# Patient Record
Sex: FEMALE | Race: WHITE
[De-identification: names, ages, dates, MRNs, and addresses within clinical notes are randomized per-mention and may not be internally consistent; named-entity substitution may affect disease eponyms.]

---

## 2017-05-24 ENCOUNTER — HOSPITAL ENCOUNTER (OUTPATIENT)
Dept: HOSPITAL 80 - FSGY | Age: 62
Discharge: HOME | End: 2017-05-24
Attending: INTERNAL MEDICINE
Payer: COMMERCIAL

## 2017-05-24 DIAGNOSIS — Z83.71: ICD-10-CM

## 2017-05-24 DIAGNOSIS — K63.5: Primary | ICD-10-CM

## 2017-05-24 DIAGNOSIS — Z86.010: ICD-10-CM

## 2017-05-24 PROCEDURE — 0D5H8ZZ DESTRUCTION OF CECUM, VIA NATURAL OR ARTIFICIAL OPENING ENDOSCOPIC: ICD-10-PCS | Performed by: INTERNAL MEDICINE

## 2017-05-24 PROCEDURE — 0DJD8ZZ INSPECTION OF LOWER INTESTINAL TRACT, VIA NATURAL OR ARTIFICIAL OPENING ENDOSCOPIC: ICD-10-PCS | Performed by: INTERNAL MEDICINE

## 2017-05-24 PROCEDURE — 0DBH8ZX EXCISION OF CECUM, VIA NATURAL OR ARTIFICIAL OPENING ENDOSCOPIC, DIAGNOSTIC: ICD-10-PCS | Performed by: INTERNAL MEDICINE

## 2017-05-24 NOTE — GPN
[f 
rep st]



                                                                 PROCEDURE NOTE





DATE OF PROCEDURE:  05/24/2017



PROCEDURES:  Colonoscopy and biopsy, and argon plasma coagulation and 

destruction of tissue.



INDICATIONS:  History of 19 mm polyp removed from the ascending colon in 
piecemeal fashion in October of 2015, followup colonoscopy in March of 2016 at 
the [facility] revealed a small amount of residual adenomatous tissue at that 
site.  This is to follow up that previous polypectomy site, and treat the area 
with the argon plasma coagulation.



INFORMED CONSENT:  I had a detailed discussed with the patient regarding the 
procedure, alternatives, benefits, and risks including bleeding, perforation, 
infection, risk of medication.  Informed consent was signed and witnessed.



COMPLICATIONS:  None immediate.



MEDICATIONS:  Versed 8 mg IV, fentanyl 100 mcg IV.



DESCRIPTION OF PROCEDURE:  After adequate sedation, the patient remained in 
left lateral decubitus position, and I performed a visual and digital anorectal 
examination.  The video colonoscope was inserted via the rectum and advanced 
under visualization into the cecum, identified by the ileocecal valve, 
appendiceal orifice, confluence of tinea.  Retroflexed examination was 
performed in the cecum.  On retroflex of the endoscope, there was a small 2 mm 
polyp located in the cecum that was removed in total by cold biopsy.  The 
previous polypectomy site at the ascending colon was noted with Ava ink on 
either area.  There was mildly abnormal--appearing tissue at that site.  
Multiple biopsies were obtained.  The area was then treated with argon plasma 
coagulation for tissue destruction.  Good coagulation and tissue destruction 
were noted.  The colonoscope was then slowly withdrawn, with careful attention 
paid to mucosal detail.  There were no other polyps or masses noted.  The 
retroflexed examination was performed.  The endoscope was un-retroflexed and 
removed, confirming above findings.  The patient tolerated the procedure well 
and was sent to recovery room in satisfactory condition.



IMPRESSION:  

1.  2 mm cecal polyp removed with cold biopsy.  

2.  Abnormal tissue at previous polypectomy site status post biopsy and argon 
plasma coagulation and destruction of any remaining polyp tissue.



RECOMMENDATIONS:  

1.  Follow up pathology.  

2.  Avoid aspirin and nonsteroidal anti-inflammatory drugs for 10 days, except 
as using for cardiac or stroke prevention.  Tylenol is fine to use.

3.  If there is no residual adenomatous tissue, I would recommend colonoscopy 
in 3 years.  If there is residual adenomatous tissue at the previous 
polypectomy site, I recommend colonoscopy in 1 year.

4.  High-fiber diet.

5.  Followup with primary care physician as scheduled. 





Thank you for allowing me to participate in the patient's healthcare.  Do not 
hesitate to call me with any questions.





Job #:  [id number]

MTDD

## 2017-06-20 ENCOUNTER — HOSPITAL ENCOUNTER (OUTPATIENT)
Dept: HOSPITAL 80 - FIMAGING | Age: 62
End: 2017-06-20
Attending: INTERNAL MEDICINE
Payer: COMMERCIAL

## 2017-06-20 DIAGNOSIS — Z12.31: Primary | ICD-10-CM

## 2017-06-20 PROCEDURE — G0202 SCR MAMMO BI INCL CAD: HCPCS

## 2018-05-30 ENCOUNTER — HOSPITAL ENCOUNTER (OUTPATIENT)
Dept: HOSPITAL 80 - FSGY | Age: 63
Discharge: HOME | End: 2018-05-30
Attending: INTERNAL MEDICINE
Payer: COMMERCIAL

## 2018-05-30 VITALS — DIASTOLIC BLOOD PRESSURE: 78 MMHG | SYSTOLIC BLOOD PRESSURE: 121 MMHG

## 2018-05-30 DIAGNOSIS — Z78.0: ICD-10-CM

## 2018-05-30 DIAGNOSIS — Z12.11: Primary | ICD-10-CM

## 2018-05-30 DIAGNOSIS — Z86.010: ICD-10-CM

## 2018-05-30 PROCEDURE — 0DBK8ZZ EXCISION OF ASCENDING COLON, VIA NATURAL OR ARTIFICIAL OPENING ENDOSCOPIC: ICD-10-PCS | Performed by: INTERNAL MEDICINE

## 2018-05-30 NOTE — GIREPORT
[facility]

Surgical Services - Endoscopy Department

_____________________________________________________________________________________________________
_______

Patient Name: Queenie Murray                        Procedure Date: 5/30/2018 12:05 PM

MRN: [id number]                                       Account Number: [id number]

Patient Type: Outpatient                             Attending  MD/ ER Physician: ROSS Hermosillo

_____________________________________________________________________________________________________
_______

 

Procedure:                    Colonoscopy

Indications:                  Surveillance: Piecemeal removal of large sessile adenoma last colonosco
py (< 

                              3 yrs)

Providers:                    Omar Lopez MD

Referring MD:                 Flower Dumont

Medicines:                    Fentanyl 100 micrograms IV, Midazolam 6 mg IV

Complications:                No immediate complications. Estimated blood loss: Minimal.

Description of Procedure:     After obtaining informed consent, the scope was passed under direct vis
ion. 

                              Throughout the procedure, the patient's blood pressure, pulse, and oxyg
en 

                              saturations were monitored continuously. The Colonoscope with irrigatio
n 

                              channel was introduced through the anus and advanced to the terminal il
eum, 

                              with identification of the appendiceal orifice and IC valve. The colono
scopy 

                              was performed without difficulty. The patient tolerated the procedure w
ell. 

                              The quality of the bowel preparation was good.

Findings:                     The digital rectal exam was normal.

                              The terminal ileum appeared normal.

                              A post polypectomy scar was found in the ascending colon. The scar tiss
ue 

                              was healthy in appearance. There was no evidence of the previous polyp.
 

                              Biopsies were taken with a cold forceps for histology. Estimated blood 
loss 

                              was minimal.

                              The exam was otherwise without abnormality.

Estimated Blood Loss:         Estimated blood loss was minimal.

Post Op Diagnosis:            - The examined portion of the ileum was normal.

                              - Post-polypectomy scar in the ascending colon. Biopsied.

                              - The examination was otherwise normal.

Recommendation:               - Await pathology results.

                              - My office will call with the pathology result with 5-7 days. If you h
ave 

                              not heard from my office by 12-14, do not assume the pathology is alli
l, 

                              please call [phone] to get the pathology results.

                              - If the pathology report reveals no adenomatous tissue, then repeat th
e 

                              colonoscopy for surveillance in 3 years.

                              - If the pathology report reveals adenomatous tissue, then repeat the 

                              colonoscopy for surveillance in 1 year.

                              - Resume previous diet.

                              - Patient has a contact number available for emergencies. The signs and
 

                              symptoms of potential delayed complications were discussed with the pat
ient. 

                              Return to normal activities tomorrow. Written discharge instructions we
re 

                              provided to the patient.

                              - Patient medication history reviewed. Patient is appropriately not alaina
ing 

                              any medications.

                              - Discharge patient to home (ambulatory).

                              - Return to referring physician as previously scheduled.

                              - Thank you for allowing me to help in your patient's care. Do not hesi
still 

                              to call with any questions.

Attending Participation:

     I personally performed the entire procedure.

 

Jessica Nelson M.D

___________________

Omar Lopez MD

5/30/2018 12:55:04 PM

This report has been signed electronicallyMathew MD Jessica

Number of Addenda: 0

 

Note Initiated On: 5/30/2018 12:05 PM

Total Procedure Duration Time 0 hours 14 minutes 26 seconds 

[email or web address]

## 2018-05-30 NOTE — PDGENHP
History & Physical


Chief Complaint: phx polyps


History of Present Illness: phx polyps


Pertinent Past, Social, Family History: FHx - dad with type 2hodgkins nocc but 

fhx polyps.  no tobacco.  alcohol 4/week


Relevant Physical Exam: A+Ox3.  CTA.  S1S2.  +BS, soft, nt


Cardiorespiratory Assessment: class 1

## 2018-06-26 ENCOUNTER — HOSPITAL ENCOUNTER (OUTPATIENT)
Dept: HOSPITAL 80 - FIMAGING | Age: 63
Discharge: HOME | End: 2018-06-26
Attending: INTERNAL MEDICINE
Payer: COMMERCIAL

## 2018-06-26 DIAGNOSIS — Z12.31: Primary | ICD-10-CM

## 2018-07-30 ENCOUNTER — HOSPITAL ENCOUNTER (OUTPATIENT)
Dept: HOSPITAL 80 - FIMAGING | Age: 63
End: 2018-07-30
Attending: INTERNAL MEDICINE
Payer: COMMERCIAL

## 2018-07-30 DIAGNOSIS — M85.89: ICD-10-CM

## 2018-07-30 DIAGNOSIS — Z78.0: ICD-10-CM

## 2018-07-30 DIAGNOSIS — Z13.820: Primary | ICD-10-CM

## 2019-06-25 ENCOUNTER — HOSPITAL ENCOUNTER (OUTPATIENT)
Dept: HOSPITAL 80 - FIMAGING | Age: 64
End: 2019-06-25
Payer: COMMERCIAL